# Patient Record
Sex: FEMALE | Race: WHITE | Employment: FULL TIME | URBAN - METROPOLITAN AREA
[De-identification: names, ages, dates, MRNs, and addresses within clinical notes are randomized per-mention and may not be internally consistent; named-entity substitution may affect disease eponyms.]

---

## 2018-07-20 ENCOUNTER — HOSPITAL ENCOUNTER (OUTPATIENT)
Dept: ULTRASOUND IMAGING | Age: 38
Discharge: HOME | End: 2018-07-20
Payer: COMMERCIAL

## 2018-07-20 ENCOUNTER — HOSPITAL ENCOUNTER (OUTPATIENT)
Dept: MAMMOGRAPHY | Age: 38
Discharge: HOME | End: 2018-07-22
Payer: COMMERCIAL

## 2018-07-20 DIAGNOSIS — N64.4 BREAST PAIN, RIGHT: ICD-10-CM

## 2018-07-20 DIAGNOSIS — Z12.39 BREAST SCREENING: ICD-10-CM

## 2018-07-20 DIAGNOSIS — R92.8 ABNORMAL MAMMOGRAM: ICD-10-CM

## 2018-07-20 PROCEDURE — 76642 ULTRASOUND BREAST LIMITED: CPT

## 2018-07-20 PROCEDURE — G0279 TOMOSYNTHESIS, MAMMO: HCPCS

## 2018-07-30 ENCOUNTER — INITIAL CONSULT (OUTPATIENT)
Dept: SURGERY | Age: 38
End: 2018-07-30
Payer: COMMERCIAL

## 2018-07-30 VITALS
SYSTOLIC BLOOD PRESSURE: 126 MMHG | WEIGHT: 175 LBS | BODY MASS INDEX: 25.05 KG/M2 | HEIGHT: 70 IN | DIASTOLIC BLOOD PRESSURE: 84 MMHG | HEART RATE: 92 BPM | OXYGEN SATURATION: 100 %

## 2018-07-30 DIAGNOSIS — R92.8 ABNORMAL MAMMOGRAM: Primary | ICD-10-CM

## 2018-07-30 PROCEDURE — 99204 OFFICE O/P NEW MOD 45 MIN: CPT | Performed by: SURGERY

## 2018-07-30 RX ORDER — LEVONORGESTREL AND ETHINYL ESTRADIOL 0.1-0.02MG
KIT ORAL
COMMUNITY
Start: 2018-06-06

## 2018-07-31 ENCOUNTER — TELEPHONE (OUTPATIENT)
Dept: SURGERY | Age: 38
End: 2018-07-31

## 2018-08-01 ENCOUNTER — HOSPITAL ENCOUNTER (OUTPATIENT)
Age: 38
Discharge: HOME | End: 2018-08-01
Payer: COMMERCIAL

## 2018-08-01 LAB
INR BLD: 1
PROTHROMBIN TIME: 11.6 SEC (ref 9.3–12.4)

## 2018-08-01 PROCEDURE — 36415 COLL VENOUS BLD VENIPUNCTURE: CPT

## 2018-08-01 PROCEDURE — 85610 PROTHROMBIN TIME: CPT

## 2018-08-03 ENCOUNTER — HOSPITAL ENCOUNTER (OUTPATIENT)
Dept: INTERVENTIONAL RADIOLOGY/VASCULAR | Age: 38
Discharge: HOME | End: 2018-08-03
Payer: COMMERCIAL

## 2018-08-03 ENCOUNTER — HOSPITAL ENCOUNTER (OUTPATIENT)
Dept: MAMMOGRAPHY | Age: 38
Discharge: HOME | End: 2018-08-05
Payer: COMMERCIAL

## 2018-08-03 DIAGNOSIS — R92.8 ABNORMAL MAMMOGRAM: ICD-10-CM

## 2018-08-03 PROCEDURE — A4648 IMPLANTABLE TISSUE MARKER: HCPCS

## 2018-08-03 PROCEDURE — 77065 DX MAMMO INCL CAD UNI: CPT

## 2018-08-03 PROCEDURE — 88305 TISSUE EXAM BY PATHOLOGIST: CPT

## 2018-08-16 ENCOUNTER — OFFICE VISIT (OUTPATIENT)
Dept: SURGERY | Age: 38
End: 2018-08-16
Payer: COMMERCIAL

## 2018-08-16 VITALS — OXYGEN SATURATION: 98 % | DIASTOLIC BLOOD PRESSURE: 82 MMHG | SYSTOLIC BLOOD PRESSURE: 120 MMHG | HEART RATE: 84 BPM

## 2018-08-16 DIAGNOSIS — N63.0 BREAST MASS: Primary | ICD-10-CM

## 2018-08-16 PROCEDURE — 99214 OFFICE O/P EST MOD 30 MIN: CPT | Performed by: SURGERY

## 2019-11-29 ENCOUNTER — HOSPITAL ENCOUNTER (OUTPATIENT)
Dept: MAMMOGRAPHY | Age: 39
Discharge: HOME | End: 2019-12-01
Payer: COMMERCIAL

## 2019-11-29 DIAGNOSIS — Z12.39 BREAST SCREENING: ICD-10-CM

## 2019-11-29 PROCEDURE — 77067 SCR MAMMO BI INCL CAD: CPT

## 2021-05-11 ENCOUNTER — HOSPITAL ENCOUNTER (OUTPATIENT)
Dept: MAMMOGRAPHY | Age: 41
Discharge: HOME | End: 2021-05-13
Payer: COMMERCIAL

## 2021-05-11 VITALS — BODY MASS INDEX: 30.06 KG/M2 | HEIGHT: 70 IN | WEIGHT: 210 LBS

## 2021-05-11 DIAGNOSIS — Z12.31 ENCOUNTER FOR SCREENING MAMMOGRAM FOR MALIGNANT NEOPLASM OF BREAST: ICD-10-CM

## 2021-05-11 PROCEDURE — 77063 BREAST TOMOSYNTHESIS BI: CPT

## 2022-05-27 RX ORDER — HYDROXYZINE PAMOATE 25 MG/1
25 CAPSULE ORAL NIGHTLY PRN
COMMUNITY

## 2022-05-27 RX ORDER — FLUOXETINE HYDROCHLORIDE 20 MG/1
20 CAPSULE ORAL DAILY
COMMUNITY

## 2022-05-27 RX ORDER — ATORVASTATIN CALCIUM 20 MG/1
20 TABLET, FILM COATED ORAL EVERY EVENING
COMMUNITY

## 2022-06-02 ENCOUNTER — ANESTHESIA EVENT (OUTPATIENT)
Dept: OPERATING ROOM | Age: 42
End: 2022-06-02
Payer: COMMERCIAL

## 2022-06-02 ASSESSMENT — LIFESTYLE VARIABLES: SMOKING_STATUS: 0

## 2022-06-02 NOTE — ANESTHESIA PRE PROCEDURE
Department of Anesthesiology  Preprocedure Note       Name:  Otilia Miranda   Age:  42 y.o.  :  1980                                          MRN:  [id number]         Date:  6/3/2022      Surgeon: María Cornejo):  Triny Treadwell MD    Procedure: Procedure(s):  PARS PLANA VITRECTOMY, REMOVAL SO, AIR FLUID EXCHANGE, RETRO, 25G. Medications prior to admission:   Prior to Admission medications    Medication Sig Start Date End Date Taking? Authorizing Provider   FLUoxetine (PROZAC) 20 MG capsule Take 20 mg by mouth daily   Yes Historical Provider, MD   atorvastatin (LIPITOR) 20 MG tablet Take 20 mg by mouth every evening   Yes Historical Provider, MD   hydrOXYzine (VISTARIL) 25 MG capsule Take 25 mg by mouth nightly as needed for Itching   Yes Historical Provider, MD   Multiple Vitamins-Minerals (AIRBORNE PO) Take by mouth daily Except Saturday and    Yes Historical Provider, MD   levonorgestrel-ethinyl estradiol (AVIANE;ALESSE;LESSINA) 0.1-20 MG-MCG per tablet  18   Historical Provider, MD   naproxen (NAPROSYN) 500 MG tablet Take 1 tablet by mouth 2 times daily  Patient not taking: Reported on 2022   MADALYN Hair CNP   levothyroxine (SYNTHROID) 75 MCG tablet TAKE 1 TABLET DAILY  Patient not taking: Reported on 2022 3/28/16   MADALYN Pettit CNP   Multiple Vitamins-Minerals (MULTI FOR HER PO) Take by mouth    Historical Provider, MD   omeprazole (PRILOSEC) 40 MG capsule Take 1 capsule by mouth daily  Patient not taking: Reported on 2022 3/16/16   MADALYN Pettit CNP   ondansetron (ZOFRAN) 4 MG tablet Take 1 tablet by mouth every 8 hours as needed for Nausea or Vomiting  Patient not taking: Reported on 2022 3/16/16   MADALYN Pettit CNP       Current medications:    No current facility-administered medications for this encounter.      Current Outpatient Medications   Medication Sig Dispense Refill    FLUoxetine (PROZAC) 20 MG capsule Take 20 mg by mouth daily      atorvastatin (LIPITOR) 20 MG tablet Take 20 mg by mouth every evening      hydrOXYzine (VISTARIL) 25 MG capsule Take 25 mg by mouth nightly as needed for Itching      Multiple Vitamins-Minerals (AIRBORNE PO) Take by mouth daily Except Saturday and       levonorgestrel-ethinyl estradiol (AVIANE;ALESSE;LESSINA) 0.1-20 MG-MCG per tablet  (Patient not taking: Reported on 2022)      naproxen (NAPROSYN) 500 MG tablet Take 1 tablet by mouth 2 times daily (Patient not taking: Reported on 2022) 60 tablet 0    levothyroxine (SYNTHROID) 75 MCG tablet TAKE 1 TABLET DAILY (Patient not taking: Reported on 2022) 90 tablet 3    Multiple Vitamins-Minerals (MULTI FOR HER PO) Take by mouth      omeprazole (PRILOSEC) 40 MG capsule Take 1 capsule by mouth daily (Patient not taking: Reported on 2022) 30 capsule 0    ondansetron (ZOFRAN) 4 MG tablet Take 1 tablet by mouth every 8 hours as needed for Nausea or Vomiting (Patient not taking: Reported on 2022) 30 tablet 0       Allergies:  No Known Allergies    Problem List:    Patient Active Problem List   Diagnosis Code    Liveborn by  Z38.01    Nausea alone R11.0       Past Medical History:        Diagnosis Date    Anxiety     Hyperlipidemia     Hypothyroidism     Infertility, female     Thyroid disease     no medication       Past Surgical History:        Procedure Laterality Date    BREAST BIOPSY      bilateral- benign     SECTION         Social History:    Social History     Tobacco Use    Smoking status: Never Smoker    Smokeless tobacco: Never Used   Substance Use Topics    Alcohol use: No     Comment: month- 2 drinks                                Counseling given: Not Answered      Vital Signs (Current):   Vitals:    22 1521   Weight: 205 lb (93 kg)   Height: 5' 10\" (1.778 m)                                              BP Readings from Last 3 Encounters:   18 120/82   18 126/84   05/26/16 118/72       NPO Status:                                                                                 BMI:   Wt Readings from Last 3 Encounters:   05/11/21 210 lb (95.3 kg)   07/30/18 175 lb (79.4 kg)   05/26/16 189 lb 8 oz (86 kg)     Body mass index is 29.41 kg/m². CBC:   Lab Results   Component Value Date    WBC 5.9 08/15/2014    RBC 4.38 08/15/2014    HGB 13.4 08/15/2014    HCT 40.2 08/15/2014    MCV 91.8 08/15/2014    RDW 13.1 08/15/2014     08/15/2014       CMP:   Lab Results   Component Value Date     12/07/2017    K 4.4 12/07/2017    CL 98 12/07/2017    CO2 28 12/07/2017    BUN 13 12/07/2017    CREATININE 0.9 12/07/2017    GFRAA >60 12/07/2017    LABGLOM >60 12/07/2017    GLUCOSE 90 12/07/2017    PROT 7.9 12/07/2017    CALCIUM 10.0 12/07/2017    BILITOT 0.9 12/07/2017    ALKPHOS 57 12/07/2017    AST 17 12/07/2017    ALT 12 12/07/2017       POC Tests: No results for input(s): POCGLU, POCNA, POCK, POCCL, POCBUN, POCHEMO, POCHCT in the last 72 hours. Coags:   Lab Results   Component Value Date    PROTIME 11.6 08/01/2018    INR 1.0 08/01/2018       HCG (If Applicable): No results found for: PREGTESTUR, PREGSERUM, HCG, HCGQUANT     ABGs: No results found for: PHART, PO2ART, CRE7UER, HOO6ITD, BEART, Q6UNYMKJ     Type & Screen (If Applicable):  No results found for: LABABO, LABRH    Drug/Infectious Status (If Applicable):  No results found for: HIV, HEPCAB    COVID-19 Screening (If Applicable): No results found for: COVID19        Anesthesia Evaluation  Patient summary reviewed no history of anesthetic complications:   Airway: Mallampati: II  TM distance: >3 FB   Neck ROM: full  Mouth opening: > = 3 FB   Dental:      Comment: Dentition intact, denies any loose teeth.     Pulmonary: breath sounds clear to auscultation      (-) not a current smoker                           Cardiovascular:    (+) hyperlipidemia        Rhythm: regular  Rate: normal                    Neuro/Psych: Negative Neuro/Psych ROS              GI/Hepatic/Renal: Neg GI/Hepatic/Renal ROS            Endo/Other:    (+) hypothyroidism::., .                  ROS comment: Retinal detachment of right eye with retinal break  Abdominal:   (+) obese,           Vascular: negative vascular ROS. Other Findings:           Anesthesia Plan      MAC     ASA 2       Induction: intravenous. Anesthetic plan and risks discussed with patient. Plan discussed with CRNA.                 Ivania Cohen MD  6-6-22

## 2022-06-05 ENCOUNTER — PREP FOR PROCEDURE (OUTPATIENT)
Dept: OPHTHALMOLOGY | Age: 42
End: 2022-06-05

## 2022-06-05 RX ORDER — SODIUM CHLORIDE 0.9 % (FLUSH) 0.9 %
5-40 SYRINGE (ML) INJECTION EVERY 12 HOURS SCHEDULED
Status: CANCELLED | OUTPATIENT
Start: 2022-06-05

## 2022-06-05 RX ORDER — PHENYLEPHRINE HCL 2.5 %
1 DROPS OPHTHALMIC (EYE) SEE ADMIN INSTRUCTIONS
Status: CANCELLED | OUTPATIENT
Start: 2022-06-06

## 2022-06-05 RX ORDER — PROPARACAINE HYDROCHLORIDE 5 MG/ML
1 SOLUTION/ DROPS OPHTHALMIC SEE ADMIN INSTRUCTIONS
Status: CANCELLED | OUTPATIENT
Start: 2022-06-06

## 2022-06-05 RX ORDER — TROPICAMIDE 10 MG/ML
1 SOLUTION/ DROPS OPHTHALMIC SEE ADMIN INSTRUCTIONS
Status: CANCELLED | OUTPATIENT
Start: 2022-06-06

## 2022-06-05 RX ORDER — SODIUM CHLORIDE 9 MG/ML
INJECTION, SOLUTION INTRAVENOUS PRN
Status: CANCELLED | OUTPATIENT
Start: 2022-06-05

## 2022-06-05 RX ORDER — SODIUM CHLORIDE 0.9 % (FLUSH) 0.9 %
5-40 SYRINGE (ML) INJECTION PRN
Status: CANCELLED | OUTPATIENT
Start: 2022-06-05

## 2022-06-05 RX ORDER — OFLOXACIN 3 MG/ML
1 SOLUTION/ DROPS OPHTHALMIC 4 TIMES DAILY
Status: CANCELLED | OUTPATIENT
Start: 2022-06-06 | End: 2022-06-06

## 2022-06-05 NOTE — H&P
I have examined the patient and reviewed the history and physical from 5/23/22 and find no relevant changes. I have reviewed with the patient and/or family the risks, benefits, and alternatives to the procedure and they have agreed to proceed.     Lia Terrell MD

## 2022-06-06 ENCOUNTER — HOSPITAL ENCOUNTER (OUTPATIENT)
Age: 42
Setting detail: OUTPATIENT SURGERY
Discharge: HOME | End: 2022-06-06
Attending: OPHTHALMOLOGY | Admitting: OPHTHALMOLOGY
Payer: COMMERCIAL

## 2022-06-06 ENCOUNTER — ANESTHESIA (OUTPATIENT)
Dept: OPERATING ROOM | Age: 42
End: 2022-06-06
Payer: COMMERCIAL

## 2022-06-06 VITALS
SYSTOLIC BLOOD PRESSURE: 127 MMHG | OXYGEN SATURATION: 96 % | HEIGHT: 70 IN | RESPIRATION RATE: 16 BRPM | DIASTOLIC BLOOD PRESSURE: 86 MMHG | BODY MASS INDEX: 29.35 KG/M2 | HEART RATE: 71 BPM | TEMPERATURE: 98.6 F | WEIGHT: 205 LBS

## 2022-06-06 LAB
HCG, URINE, POC: NEGATIVE
Lab: NORMAL
NEGATIVE QC PASS/FAIL: NORMAL
POSITIVE QC PASS/FAIL: NORMAL

## 2022-06-06 PROCEDURE — 2500000003 HC RX 250 WO HCPCS: Performed by: OPHTHALMOLOGY

## 2022-06-06 PROCEDURE — 2709999900 HC NON-CHARGEABLE SUPPLY: Performed by: OPHTHALMOLOGY

## 2022-06-06 PROCEDURE — 7100000011 HC PHASE II RECOVERY - ADDTL 15 MIN: Performed by: OPHTHALMOLOGY

## 2022-06-06 PROCEDURE — 7100000010 HC PHASE II RECOVERY - FIRST 15 MIN: Performed by: OPHTHALMOLOGY

## 2022-06-06 PROCEDURE — 3600000003 HC SURGERY LEVEL 3 BASE: Performed by: OPHTHALMOLOGY

## 2022-06-06 PROCEDURE — 3700000000 HC ANESTHESIA ATTENDED CARE: Performed by: OPHTHALMOLOGY

## 2022-06-06 PROCEDURE — 6360000002 HC RX W HCPCS: Performed by: NURSE ANESTHETIST, CERTIFIED REGISTERED

## 2022-06-06 PROCEDURE — 2580000003 HC RX 258: Performed by: ANESTHESIOLOGY

## 2022-06-06 PROCEDURE — 6370000000 HC RX 637 (ALT 250 FOR IP): Performed by: OPHTHALMOLOGY

## 2022-06-06 PROCEDURE — 3600000013 HC SURGERY LEVEL 3 ADDTL 15MIN: Performed by: OPHTHALMOLOGY

## 2022-06-06 PROCEDURE — 3700000001 HC ADD 15 MINUTES (ANESTHESIA): Performed by: OPHTHALMOLOGY

## 2022-06-06 RX ORDER — BALANCED SALT SOLUTION 6.4; .75; .48; .3; 3.9; 1.7 MG/ML; MG/ML; MG/ML; MG/ML; MG/ML; MG/ML
SOLUTION OPHTHALMIC PRN
Status: DISCONTINUED | OUTPATIENT
Start: 2022-06-06 | End: 2022-06-06 | Stop reason: ALTCHOICE

## 2022-06-06 RX ORDER — MIDAZOLAM HYDROCHLORIDE 1 MG/ML
INJECTION INTRAMUSCULAR; INTRAVENOUS PRN
Status: DISCONTINUED | OUTPATIENT
Start: 2022-06-06 | End: 2022-06-06 | Stop reason: SDUPTHER

## 2022-06-06 RX ORDER — TROPICAMIDE 10 MG/ML
1 SOLUTION/ DROPS OPHTHALMIC SEE ADMIN INSTRUCTIONS
Status: DISCONTINUED | OUTPATIENT
Start: 2022-06-06 | End: 2022-06-06 | Stop reason: HOSPADM

## 2022-06-06 RX ORDER — SODIUM CHLORIDE 0.9 % (FLUSH) 0.9 %
5-40 SYRINGE (ML) INJECTION EVERY 12 HOURS SCHEDULED
Status: DISCONTINUED | OUTPATIENT
Start: 2022-06-06 | End: 2022-06-06 | Stop reason: HOSPADM

## 2022-06-06 RX ORDER — FENTANYL CITRATE 50 UG/ML
INJECTION, SOLUTION INTRAMUSCULAR; INTRAVENOUS PRN
Status: DISCONTINUED | OUTPATIENT
Start: 2022-06-06 | End: 2022-06-06 | Stop reason: SDUPTHER

## 2022-06-06 RX ORDER — NEOMYCIN SULFATE, POLYMYXIN B SULFATE, AND DEXAMETHASONE 3.5; 10000; 1 MG/G; [USP'U]/G; MG/G
OINTMENT OPHTHALMIC PRN
Status: DISCONTINUED | OUTPATIENT
Start: 2022-06-06 | End: 2022-06-06 | Stop reason: ALTCHOICE

## 2022-06-06 RX ORDER — PHENYLEPHRINE HCL 2.5 %
1 DROPS OPHTHALMIC (EYE) SEE ADMIN INSTRUCTIONS
Status: DISCONTINUED | OUTPATIENT
Start: 2022-06-06 | End: 2022-06-06 | Stop reason: HOSPADM

## 2022-06-06 RX ORDER — SODIUM CHLORIDE 9 MG/ML
INJECTION, SOLUTION INTRAVENOUS PRN
Status: DISCONTINUED | OUTPATIENT
Start: 2022-06-06 | End: 2022-06-06 | Stop reason: HOSPADM

## 2022-06-06 RX ORDER — SODIUM CHLORIDE 0.9 % (FLUSH) 0.9 %
5-40 SYRINGE (ML) INJECTION PRN
Status: DISCONTINUED | OUTPATIENT
Start: 2022-06-06 | End: 2022-06-06 | Stop reason: HOSPADM

## 2022-06-06 RX ORDER — OFLOXACIN 3 MG/ML
1 SOLUTION/ DROPS OPHTHALMIC 4 TIMES DAILY
Status: COMPLETED | OUTPATIENT
Start: 2022-06-06 | End: 2022-06-06

## 2022-06-06 RX ORDER — SODIUM CHLORIDE, SODIUM LACTATE, POTASSIUM CHLORIDE, CALCIUM CHLORIDE 600; 310; 30; 20 MG/100ML; MG/100ML; MG/100ML; MG/100ML
INJECTION, SOLUTION INTRAVENOUS CONTINUOUS
Status: DISCONTINUED | OUTPATIENT
Start: 2022-06-06 | End: 2022-06-06 | Stop reason: HOSPADM

## 2022-06-06 RX ORDER — TETRACAINE HYDROCHLORIDE 5 MG/ML
SOLUTION OPHTHALMIC PRN
Status: DISCONTINUED | OUTPATIENT
Start: 2022-06-06 | End: 2022-06-06 | Stop reason: ALTCHOICE

## 2022-06-06 RX ORDER — PROPARACAINE HYDROCHLORIDE 5 MG/ML
1 SOLUTION/ DROPS OPHTHALMIC SEE ADMIN INSTRUCTIONS
Status: COMPLETED | OUTPATIENT
Start: 2022-06-06 | End: 2022-06-06

## 2022-06-06 RX ADMIN — SODIUM CHLORIDE, POTASSIUM CHLORIDE, SODIUM LACTATE AND CALCIUM CHLORIDE: 600; 310; 30; 20 INJECTION, SOLUTION INTRAVENOUS at 11:45

## 2022-06-06 RX ADMIN — PHENYLEPHRINE HYDROCHLORIDE 1 DROP: 25 SOLUTION/ DROPS OPHTHALMIC at 11:46

## 2022-06-06 RX ADMIN — OFLOXACIN 1 DROP: 3 SOLUTION OPHTHALMIC at 11:47

## 2022-06-06 RX ADMIN — MIDAZOLAM 2 MG: 1 INJECTION INTRAMUSCULAR; INTRAVENOUS at 12:24

## 2022-06-06 RX ADMIN — FENTANYL CITRATE 50 MCG: 50 INJECTION INTRAMUSCULAR; INTRAVENOUS at 12:35

## 2022-06-06 RX ADMIN — FENTANYL CITRATE 50 MCG: 50 INJECTION INTRAMUSCULAR; INTRAVENOUS at 12:25

## 2022-06-06 RX ADMIN — PROPARACAINE HYDROCHLORIDE 1 DROP: 5 SOLUTION/ DROPS OPHTHALMIC at 11:46

## 2022-06-06 RX ADMIN — FENTANYL CITRATE 50 MCG: 50 INJECTION INTRAMUSCULAR; INTRAVENOUS at 12:26

## 2022-06-06 RX ADMIN — TROPICAMIDE 1 DROP: 10 SOLUTION/ DROPS OPHTHALMIC at 11:46

## 2022-06-06 ASSESSMENT — PAIN - FUNCTIONAL ASSESSMENT: PAIN_FUNCTIONAL_ASSESSMENT: NONE - DENIES PAIN

## 2022-06-06 ASSESSMENT — PAIN SCALES - GENERAL
PAINLEVEL_OUTOF10: 0
PAINLEVEL_OUTOF10: 0

## 2022-06-06 NOTE — ANESTHESIA POSTPROCEDURE EVALUATION
Department of Anesthesiology  Postprocedure Note    Patient: Young Syed  MRN: [id number]  YOB: 1980  Date of evaluation: 6/6/2022  Time:  1:15 PM     Procedure Summary     Date: 06/06/22 Room / Location: [address] [place] [address]    Anesthesia Start: 7568 Anesthesia Stop: 1148    Procedure: PARS PLANA VITRECTOMY, REMOVAL SO, AIR FLUID EXCHANGE, RETRO, 25G. (Right Eye) Diagnosis:       Retinal detachment of right eye with retinal break      (Retinal detachment of right eye with retinal break [H33.001])    Surgeons: Omer Veloz MD Responsible Provider: Petra Blanco MD    Anesthesia Type: MAC ASA Status: 2          Anesthesia Type: MAC    Alfreda Phase I: Alfreda Score: 10    Alfreda Phase II:      Last vitals: Reviewed and per EMR flowsheets.        Anesthesia Post Evaluation    Patient location during evaluation: bedside  Patient participation: complete - patient participated  Level of consciousness: awake and alert  Pain score: 0  Airway patency: patent  Nausea & Vomiting: no nausea and no vomiting  Complications: no  Cardiovascular status: hemodynamically stable  Respiratory status: acceptable  Hydration status: euvolemic

## 2022-06-06 NOTE — OP NOTE
Operative Note      Patient: Claudia Boyle  YOB: 1980  MRN: [id number]    Date of Procedure: 6/6/2022    Surgeon: Rosie Stahl MD    Assistant: Eliel Kraft PA-C    Preoperative Diagnosis:   1) Retained silicone oil, right eye  2) History of complex retinal detachment repair, right eye    Postoperative Diagnosis:   1) Retained silicone oil, right eye  2) History of complex retinal detachment repair, right eye    Procedure Performed:   1) 25-Gauge pars plana vitrectomy, right eye  2) Silicone oil removal from the posterior segment, right eye  3) Silicone oil removal from the anterior segment, right eye  4) Fluid-air exchange, right eye    Complications:   None                                                                                                                                             Blood Loss:<5ml    Anesthesia Used:   Monitored anesthesia care with retrobulbar block    Procedure Description: After history, physical and consent were obtained, the patient was taken to the operating room where EKG leads and IV access were established. The patient then received a right bulbar block of 1:1 Lidocaine and Marcaine to the right eye. The operative eye was prepped and draped in the usual sterile fashion. A lid speculum was inserted and a trans-scleral cannula was placed in the inferotemporal quadrant 4 mm posterior to the limbus. A self-retaining infusion cannula was inserted. Transscleral cannulas were then inserted at the 2 and 10 oclock positions. The light pipe and the viscous fluid extractor were inserted in the eye, and the silicone oil was removed from the posterior segment. A complete fluid-air exchange was performed twice in order to remove any residual silicone oil droplets. The periphery was inspected with scleral depression 360 degrees, and there were no retinal tears or detachments. A sideport blade was used to make a paracentesis superiorly.   BSS on a blunt cannula was used to remove the silicone oil from the anterior chamber. The paracentesis wound was hydrated, and the wound was self-sealing. The transscleral cannulas were removed and the wounds were sutured closed with 6-0 plain gut sutures. The eye was dressed with an antibiotic/steroid ointment, patch and shield. The patient was then taken from the operating room to the recovery room in good condition having tolerated the procedure well.     Ayanna Ellington MD  6/6/2022 1:05 PM

## 2022-10-24 ENCOUNTER — HOSPITAL ENCOUNTER (OUTPATIENT)
Dept: MAMMOGRAPHY | Age: 42
Discharge: HOME | End: 2022-10-26
Payer: COMMERCIAL

## 2022-10-24 DIAGNOSIS — Z12.31 ENCOUNTER FOR SCREENING MAMMOGRAM FOR MALIGNANT NEOPLASM OF BREAST: ICD-10-CM

## 2022-10-24 PROCEDURE — 77067 SCR MAMMO BI INCL CAD: CPT

## 2025-07-23 LAB
25(OH)D3 SERPL-MCNC: 45.2 NG/ML (ref 30–100)
ERYTHROCYTE [DISTWIDTH] IN BLOOD BY AUTOMATED COUNT: 16.3 % (ref 11.5–15)
HCT VFR BLD AUTO: 32.9 % (ref 34–48)
HGB BLD-MCNC: 9.2 G/DL (ref 11.5–15.5)
IRON SATN MFR SERPL: 5 % (ref 15–50)
IRON SERPL-MCNC: 23 UG/DL (ref 37–145)
MCH RBC QN AUTO: 21.8 PG (ref 26–35)
MCHC RBC AUTO-ENTMCNC: 28 G/DL (ref 32–34.5)
MCV RBC AUTO: 78 FL (ref 80–99.9)
PLATELET # BLD AUTO: 306 K/UL (ref 130–450)
PMV BLD AUTO: 9.3 FL (ref 7–12)
RBC # BLD AUTO: 4.22 M/UL (ref 3.5–5.5)
TIBC SERPL-MCNC: 486 UG/DL (ref 250–450)
WBC OTHER # BLD: 5.8 K/UL (ref 4.5–11.5)

## 2025-07-25 ENCOUNTER — TRANSCRIBE ORDERS (OUTPATIENT)
Dept: ADMINISTRATIVE | Age: 45
End: 2025-07-25

## 2025-07-25 DIAGNOSIS — Z12.31 ENCOUNTER FOR SCREENING MAMMOGRAM FOR MALIGNANT NEOPLASM OF BREAST: Primary | ICD-10-CM

## 2025-08-18 ENCOUNTER — HOSPITAL ENCOUNTER (OUTPATIENT)
Dept: MAMMOGRAPHY | Age: 45
Discharge: HOME | End: 2025-08-20
Payer: COMMERCIAL

## 2025-08-18 VITALS — BODY MASS INDEX: 32.28 KG/M2 | WEIGHT: 225 LBS

## 2025-08-18 DIAGNOSIS — Z12.31 ENCOUNTER FOR SCREENING MAMMOGRAM FOR MALIGNANT NEOPLASM OF BREAST: ICD-10-CM

## 2025-08-18 PROCEDURE — 77063 BREAST TOMOSYNTHESIS BI: CPT

## (undated) DEVICE — NEEDLE SYR 18GA L1.5IN RED PLAS HUB S STL BLNT FILL W/O

## (undated) DEVICE — SYRINGE, LUER LOCK, 10ML: Brand: MEDLINE

## (undated) DEVICE — SHIELD EYE W3XL2.5IN UNIV CLR PLAS LTWT

## (undated) DEVICE — PACK PROCEDURE SURG RETINAL ST ES CUST

## (undated) DEVICE — VITRECTOMY PACK 25 GA INPUT

## (undated) DEVICE — 1060 S-DRAPE SPCL INCISE 10/BX 4BX/CS: Brand: STERI-DRAPE™

## (undated) DEVICE — VISCOUS FLUID CONTROL PAK: Brand: CONSTELLATION

## (undated) DEVICE — CANNULA 25GA SOFT TIP 1.0MM SIL TIP

## (undated) DEVICE — GLOVE SURG SZ 65 THK91MIL LTX FREE SYN POLYISOPRENE

## (undated) DEVICE — Device

## (undated) DEVICE — GLOVE SURG SZ 55 CRM LTX FREE POLYISOPRENE POLYMER BEAD ANTI

## (undated) DEVICE — STOPCOCK IV PRIMING 0.26ML 3 W W/ TWO FEM LUERLOK PRT 1